# Patient Record
Sex: MALE | Race: WHITE | Employment: FULL TIME | URBAN - METROPOLITAN AREA
[De-identification: names, ages, dates, MRNs, and addresses within clinical notes are randomized per-mention and may not be internally consistent; named-entity substitution may affect disease eponyms.]

---

## 2017-03-07 ENCOUNTER — TRANSFERRED RECORDS (OUTPATIENT)
Dept: HEALTH INFORMATION MANAGEMENT | Facility: CLINIC | Age: 37
End: 2017-03-07

## 2017-03-10 ENCOUNTER — TELEPHONE (OUTPATIENT)
Dept: NEUROLOGY | Facility: CLINIC | Age: 37
End: 2017-03-10

## 2017-03-10 DIAGNOSIS — I72.9 PSEUDOANEURYSM (H): Primary | ICD-10-CM

## 2017-03-10 NOTE — TELEPHONE ENCOUNTER
Head and Neck CTA from [facility] pushed to PACS. Report scanned to chart. Dr. Wynn updated and will review images. Informed patient I will contact him with plan after images are reviewed. In the interim advised patient to go to [facility] ED if he experiences symptoms. Patient verbalized understanding and agreed to this plan.

## 2017-03-10 NOTE — TELEPHONE ENCOUNTER
----- Message from Cris Rodas LPN sent at 3/10/2017  9:51 AM CST -----  Regarding: New sx  Contact: [phone]  Caller name:pt left a      Treating provider/specialty:Dr HORNE [phone]    Nurse:    Best time to return call:    Message left? Hx of stroke 10/2014    Description of issue/question:  Recent posterior head pain, then a few days later on 03/07 had ear and facial numbness; when to [facility] ED and had a CT done.  Pt very concerned and wants  to look at CT scan.  Please address.

## 2017-03-10 NOTE — TELEPHONE ENCOUNTER
Patient reports that on Sunday (3/5/17) he jumped up to get something and felt a sharp pain in the back of his neck where he has dissection. On Tuesday (3/7/17) patient awoke with numbness in left ear and left upper face/cheek - symptoms lasted ~ 7 hours. No other symptoms. Did not resolve so went to ED at [facility]. CT scan did not show any further dissection and ED advised MRI. Patient wanted to get Dr. Wynn's opinion on this before proceeding.

## 2017-03-15 NOTE — TELEPHONE ENCOUNTER
Per Dr. Wynn - Review of CTA performed at [facility] on 3/7/17 is unchanged and stable. However, in light of recent symptoms, MRI is advised, continue aspirin.     Spoke with patient who states he would prefer not to do MRI as it is a financial burden and wonders if the MRI will be more revealing and how necessary it is. Explained to patient that Dr. Wynn is concerned patient may have had a TIA. CTA would not reveal TIA/Stroke, therefore MRI is indicated. If TIA patient may need dual antiplatelet therapy or further treatment. Patient verbalized understanding, will consult with his wife and call to schedule if he decides to proceed. Order placed.

## 2017-04-18 ENCOUNTER — OFFICE VISIT (OUTPATIENT)
Dept: RADIOLOGY | Facility: CLINIC | Age: 37
End: 2017-04-18

## 2017-04-18 VITALS — DIASTOLIC BLOOD PRESSURE: 74 MMHG | SYSTOLIC BLOOD PRESSURE: 121 MMHG | HEART RATE: 68 BPM | HEIGHT: 73 IN

## 2017-04-18 DIAGNOSIS — I67.1 CEREBRAL ANEURYSM, NONRUPTURED: Primary | ICD-10-CM

## 2017-04-18 DIAGNOSIS — F41.9 ANXIETY: ICD-10-CM

## 2017-04-18 RX ORDER — LORAZEPAM 0.5 MG/1
0.5 TABLET ORAL EVERY 4 HOURS PRN
Qty: 60 TABLET | Refills: 0 | COMMUNITY
Start: 2017-04-18

## 2017-04-18 ASSESSMENT — PAIN SCALES - GENERAL: PAINLEVEL: NO PAIN (0)

## 2017-04-18 NOTE — MR AVS SNAPSHOT
After Visit Summary   4/18/2017    Unruly Avery    MRN: [id number]           Patient Information     Date Of Birth          1980        Visit Information        Provider Department      4/18/2017 4:10 PM Tyler Wynn MD [facility] Neurosurgery        Today's Diagnoses     Cerebral aneurysm, nonruptured    -  1    Anxiety           Follow-ups after your visit        Follow-up notes from your care team     Return in about 1 year (around 4/18/2018).      Your next 10 appointments already scheduled     Dec 19, 2017  4:00 PM CST   (Arrive by 3:45 PM)   CTA ANGIOGRAM HEAD with UCCT2   [facility] [place] ([facility])    [address]  1st Floor  [place] [address] [phone]           Please bring any scans or X-rays taken at other hospitals, if similar tests were done. Also bring a list of your medicines, including vitamins, minerals and over-the-counter drugs. It is safest to leave personal items at home.  Be sure to tell your doctor:   If you have any allergies.   If there s any chance you are pregnant.   If you are breastfeeding.   If you have any special needs.  You will have contrast for this exam. To prepare:   Do not eat or drink for 2 hours before your exam. If you need to take medicine, you may take it with small sips of water. (We may ask you to take liquid medicine as well.)   The day before your exam, drink extra fluids at least six 8-ounce glasses (unless your doctor tells you to restrict your fluids).  Patients over 70 or patients with diabetes or kidney problems:   If you haven t had a blood test (creatinine test) within the last 30 days, go to your clinic or Diagnostic Imaging Department for this test.  If you have diabetes:   If your kidney function is normal, continue taking your metformin (Avandamet, Glucophage, Glucovance, Metaglip) on the day of your exam.   If your kidney function is abnormal, wait 48 hours before  restarting this medicine.  Please wear loose clothing, such as a sweat suit or jogging clothes. Avoid snaps, zippers and other metal. We may ask you to undress and put on a hospital gown.  If you have any questions, please call the Imaging Department where you will have your exam.            Dec 19, 2017  4:40 PM CST   (Arrive by 4:25 PM)   Return Stroke with Tyler Wynn MD   [facility] Neurosurgery ([facility] [place])    [address] [place] [address] [phone]              Who to contact     Please call your clinic at [phone] to:    Ask questions about your health    Make or cancel appointments    Discuss your medicines    Learn about your test results    Speak to your doctor   If you have compliments or concerns about an experience at your clinic, or if you wish to file a complaint, please contact [facility] Patient Relations at [phone] or email us at [email or web address]         Additional Information About Your Visit        DrAvailablehart Information     [employer] gives you secure access to your electronic health record. If you see a primary care provider, you can also send messages to your care team and make appointments. If you have questions, please call your primary care clinic.  If you do not have a primary care provider, please call [phone] and they will assist you.      [employer] is an electronic gateway that provides easy, online access to your medical records. With [employer], you can request a clinic appointment, read your test results, renew a prescription or communicate with your care team.     To access your existing account, please contact your [facility] Clinic or call [phone] for assistance.        Care EveryWhere ID     This is your Care EveryWhere ID. This could be used by other organizations to access your [facility] medical records  [id number]        Your Vitals  "Were     Pulse Height                68 1.854 m (6' 1\")           Blood Pressure from Last 3 Encounters:   04/18/17 121/74   04/19/16 123/66   09/23/15 106/63    Weight from Last 3 Encounters:   09/23/15 80.7 kg (178 lb)   09/15/15 83.5 kg (184 lb)   09/08/15 83 kg (183 lb)               Primary Care Provider    None Specified       No primary provider on file.        Thank you!     Thank you for choosing [facility]  for your care. Our goal is always to provide you with excellent care. Hearing back from our patients is one way we can continue to improve our services. Please take a few minutes to complete the written survey that you may receive in the mail after your visit with us. Thank you!             Your Updated Medication List - Protect others around you: Learn how to safely use, store and throw away your medicines at www.disposemymeds.org.          This list is accurate as of: 4/18/17 11:59 PM.  Always use your most recent med list.                   Brand Name Dispense Instructions for use    albuterol 108 (90 BASE) MCG/ACT Inhaler    PROAIR HFA/PROVENTIL HFA/VENTOLIN HFA    1 Inhaler    Inhale 1-2 puffs into the lungs. INHALE 1-2 PUFFS EVERY 4-6 HOURS AS NEEDED       ASPIRIN PO      Take 81 mg by mouth daily       LORazepam 0.5 MG tablet    ATIVAN    60 tablet    Take 1 tablet (0.5 mg) by mouth every 4 hours as needed for anxiety       ZANTAC PO      Take 150 mg by mouth daily         "

## 2017-04-18 NOTE — LETTER
4/18/2017       RE: Unruly Avery  [address]  [place] [address]     Dear Colleague,    Thank you for referring your patient, Unruly Avery, to the [facility] NEUROSURGERY at [facility]. Please see a copy of my visit note below.    Neurointerventional Clinic Note    HPI: Unruly Avery is a 37 year old male with history of left cerebellar stroke in 10/2015. His work-up revealed left vertebral artery dissection. 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 Left vertebral artery ends in left PICA. He is continuing to take ASA 81 mg daily. He still complains of easy fatigability and neck pain. He gets anxious whenever he has pain in the neck. He also experiences feeling lost. One example was he went to grocery store and he did not know where he was about a month ago. In early March of this year he felt numbness over his left cheek and ear drum when he woke up, which prompted him to visit emergency department at [facility]. He got a CT angiography which showed unchanged dissection of the left vertebral artery. The pseudoaneurysm was less apparent in that CT angiography. He was recommended to have an MRI whish did not show any new strokes.    Otherwise he has no new neurologic symptoms. He enjoys spendng time with his 6 month old girl.       Past Medical History:   Diagnosis Date     Anxiety      H/O gastroesophageal reflux (GERD)      Other diseases of trachea and bronchus, not elsewhere classified     no inhaler needed     Past Surgical History:   Procedure Laterality Date     ENDOSCOPY  Nov 2011    GERD     Social History     Social History     Marital status:      Spouse name: N/A     Number of children: 1     Years of education: N/A     Occupational History      Self     Social History Main Topics     Smoking status: Former Smoker     Years:  6.00     Smokeless tobacco: Never Used     Alcohol use Yes      Comment: socially     Drug use: No     Sexual activity: Not Currently     Partners: Female     Birth control/ protection: Condom     Other Topics Concern     Exercise Yes     Social History Narrative    Social Documentation:        Balanced Diet: YES    Calcium intake: 1-2 per day    Caffeine: 2 in am only    Exercise:  type of activity lifting running, cardio;  3-5 times per week    Sunscreen: Yes    Seatbelts:  Yes    Self Testicular Exam: Yes    Physical/Emotional/Sexual Abuse: No    Do you feel safe in your environment? Yes        Cholesterol screen up to date: No-not fasting     Eye Exam up to date: No-wears glasses for driving only, needs to make appt     Dental Exam up to date: No-6 mo's behind     Colonoscopy up to date: Does Not Apply    Immunizations up to date: Not sure     Glucose screen if over 40:  No            Cheyenne Matos MA    1/26/2010                     Family History   Problem Relation Age of Onset     Arthritis Father      Respiratory Maternal Uncle      copd     Respiratory Maternal Uncle      copd      Respiratory Maternal Aunt      Emphysema      Allergies   Allergen Reactions     Cats        Review Of Systems  Skin: negative  Eyes: negative  Ears/Nose/Throat: negative  Respiratory: No shortness of breath, dyspnea on exertion, cough, or hemoptysis  Cardiovascular: negative  Gastrointestinal: negative  Genitourinary: negative  Musculoskeletal: negative  Neurologic: as above  Psychiatric: negative  Hematologic/Lymphatic/Immunologic: negative  Endocrine: negative      Current Outpatient Prescriptions on File Prior to Visit:  Ranitidine HCl (ZANTAC PO) Take 150 mg by mouth daily   albuterol (PROAIR HFA, PROVENTIL HFA, VENTOLIN HFA) 108 (90 BASE) MCG/ACT inhaler Inhale 1-2 puffs into the lungs. INHALE 1-2 PUFFS EVERY 4-6 HOURS AS NEEDED     No current facility-administered medications on file prior to visit.     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 Outside CTA in 3/2017 shows stable appearance of the dissection. Our plan is to continue ASA81 mg daily and follow-up with CTA in 1 year.      Chano Meneses M.D.  Endovascular Surgical Neuroradiology Fellow  Pager: [phone]    I agree with the above note by Dr. Meneses      on  4/18/17      Again, thank you for allowing me to participate in the care of your patient.      Sincerely,    Tyler Wynn MD      "

## 2017-04-18 NOTE — NURSING NOTE
Chief Complaint   Patient presents with     RECHECK     per PT - follow up for RST-Pseudoaneurysm with MRI done 4/11    Rosana Woods CMA

## 2017-04-18 NOTE — PROGRESS NOTES
Neurointerventional Clinic Note    HPI: Unruly Avery is a 37 year old male with history of left cerebellar stroke in 10/2015. His work-up revealed left vertebral artery dissection. 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 Left vertebral artery ends in left PICA. He is continuing to take ASA 81 mg daily. He still complains of easy fatigability and neck pain. He gets anxious whenever he has pain in the neck. He also experiences feeling lost. One example was he went to grocery store and he did not know where he was about a month ago. In early March of this year he felt numbness over his left cheek and ear drum when he woke up, which prompted him to visit emergency department at [facility]. He got a CT angiography which showed unchanged dissection of the left vertebral artery. The pseudoaneurysm was less apparent in that CT angiography. He was recommended to have an MRI whish did not show any new strokes.    Otherwise he has no new neurologic symptoms. He enjoys spendng time with his 6 month old girl.       Past Medical History:   Diagnosis Date     Anxiety      H/O gastroesophageal reflux (GERD)      Other diseases of trachea and bronchus, not elsewhere classified     no inhaler needed     Past Surgical History:   Procedure Laterality Date     ENDOSCOPY  Nov 2011    GERD     Social History     Social History     Marital status:      Spouse name: N/A     Number of children: 1     Years of education: N/A     Occupational History      Self     Social History Main Topics     Smoking status: Former Smoker     Years: 6.00     Smokeless tobacco: Never Used     Alcohol use Yes      Comment: socially     Drug use: No     Sexual activity: Not Currently     Partners: Female     Birth control/ protection: Condom     Other Topics Concern     Exercise Yes     Social History Narrative     "Social Documentation:        Balanced Diet: YES    Calcium intake: 1-2 per day    Caffeine: 2 in am only    Exercise:  type of activity lifting running, cardio;  3-5 times per week    Sunscreen: Yes    Seatbelts:  Yes    Self Testicular Exam: Yes    Physical/Emotional/Sexual Abuse: No    Do you feel safe in your environment? Yes        Cholesterol screen up to date: No-not fasting     Eye Exam up to date: No-wears glasses for driving only, needs to make appt     Dental Exam up to date: No-6 mo's behind     Colonoscopy up to date: Does Not Apply    Immunizations up to date: Not sure     Glucose screen if over 40:  No            Cheyenne Matos MA    1/26/2010                     Family History   Problem Relation Age of Onset     Arthritis Father      Respiratory Maternal Uncle      copd     Respiratory Maternal Uncle      copd      Respiratory Maternal Aunt      Emphysema      Allergies   Allergen Reactions     Cats        Review Of Systems  Skin: negative  Eyes: negative  Ears/Nose/Throat: negative  Respiratory: No shortness of breath, dyspnea on exertion, cough, or hemoptysis  Cardiovascular: negative  Gastrointestinal: negative  Genitourinary: negative  Musculoskeletal: negative  Neurologic: as above  Psychiatric: negative  Hematologic/Lymphatic/Immunologic: negative  Endocrine: negative      Current Outpatient Prescriptions on File Prior to Visit:  Ranitidine HCl (ZANTAC PO) Take 150 mg by mouth daily   albuterol (PROAIR HFA, PROVENTIL HFA, VENTOLIN HFA) 108 (90 BASE) MCG/ACT inhaler Inhale 1-2 puffs into the lungs. INHALE 1-2 PUFFS EVERY 4-6 HOURS AS NEEDED     No current facility-administered medications on file prior to visit.     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 Outside CTA in 3/2017 shows stable appearance of the dissection. Our plan is to continue ASA81 mg daily and follow-up with CTA in 1 year.      Chano Meneses M.D.  Endovascular Surgical Neuroradiology Fellow  Pager: [phone]    I agree with the above note by Dr. Meneses      on  4/18/17

## 2017-12-19 ENCOUNTER — TELEPHONE (OUTPATIENT)
Dept: NEUROLOGY | Facility: CLINIC | Age: 37
End: 2017-12-19

## 2017-12-19 NOTE — TELEPHONE ENCOUNTER
Contacted patient as he was late for appointment today. Patient states he was told at last visit that he did not need to follow-up again. Confirmed this with Dr. Wynn. MM informing patient that he does not need to follow-up and encouraged to call with questions.

## 2019-10-03 ENCOUNTER — HEALTH MAINTENANCE LETTER (OUTPATIENT)
Age: 39
End: 2019-10-03

## 2019-10-31 ENCOUNTER — HEALTH MAINTENANCE LETTER (OUTPATIENT)
Age: 39
End: 2019-10-31

## 2020-11-07 ENCOUNTER — HEALTH MAINTENANCE LETTER (OUTPATIENT)
Age: 40
End: 2020-11-07

## 2021-09-05 ENCOUNTER — HEALTH MAINTENANCE LETTER (OUTPATIENT)
Age: 41
End: 2021-09-05

## 2021-12-26 ENCOUNTER — HEALTH MAINTENANCE LETTER (OUTPATIENT)
Age: 41
End: 2021-12-26

## 2022-10-23 ENCOUNTER — HEALTH MAINTENANCE LETTER (OUTPATIENT)
Age: 42
End: 2022-10-23

## 2023-04-02 ENCOUNTER — HEALTH MAINTENANCE LETTER (OUTPATIENT)
Age: 43
End: 2023-04-02